# Patient Record
Sex: FEMALE | Race: AMERICAN INDIAN OR ALASKA NATIVE | NOT HISPANIC OR LATINO | URBAN - METROPOLITAN AREA
[De-identification: names, ages, dates, MRNs, and addresses within clinical notes are randomized per-mention and may not be internally consistent; named-entity substitution may affect disease eponyms.]

---

## 2022-12-21 ENCOUNTER — APPOINTMENT (OUTPATIENT)
Age: 22
Setting detail: DERMATOLOGY
End: 2022-12-21

## 2022-12-21 VITALS — HEIGHT: 68 IN | TEMPERATURE: 98.7 F | WEIGHT: 170 LBS

## 2022-12-21 DIAGNOSIS — L73.8 OTHER SPECIFIED FOLLICULAR DISORDERS: ICD-10-CM

## 2022-12-21 DIAGNOSIS — L30.4 ERYTHEMA INTERTRIGO: ICD-10-CM

## 2022-12-21 DIAGNOSIS — L72.8 OTHER FOLLICULAR CYSTS OF THE SKIN AND SUBCUTANEOUS TISSUE: ICD-10-CM

## 2022-12-21 DIAGNOSIS — L259 CONTACT DERMATITIS AND OTHER ECZEMA, UNSPECIFIED CAUSE: ICD-10-CM

## 2022-12-21 PROBLEM — L30.8 OTHER SPECIFIED DERMATITIS: Status: ACTIVE | Noted: 2022-12-21

## 2022-12-21 PROCEDURE — OTHER COUNSELING: OTHER

## 2022-12-21 PROCEDURE — OTHER PRESCRIPTION MEDICATION MANAGEMENT: OTHER

## 2022-12-21 PROCEDURE — OTHER MIPS QUALITY: OTHER

## 2022-12-21 PROCEDURE — OTHER PRESCRIPTION: OTHER

## 2022-12-21 PROCEDURE — 99203 OFFICE O/P NEW LOW 30 MIN: CPT

## 2022-12-21 RX ORDER — TRIAMCINOLONE ACETONIDE 1 MG/G
CREAM TOPICAL
Qty: 454 | Refills: 1 | Status: ERX | COMMUNITY
Start: 2022-12-21

## 2022-12-21 RX ORDER — HYDROCORTISONE 25 MG/G
CREAM TOPICAL BID
Qty: 30 | Refills: 1 | Status: ERX | COMMUNITY
Start: 2022-12-21

## 2022-12-21 ASSESSMENT — LOCATION ZONE DERM
LOCATION ZONE: HAND
LOCATION ZONE: ARM
LOCATION ZONE: TRUNK
LOCATION ZONE: EAR
LOCATION ZONE: LEG

## 2022-12-21 ASSESSMENT — LOCATION DETAILED DESCRIPTION DERM
LOCATION DETAILED: RIGHT PROXIMAL CALF
LOCATION DETAILED: RIGHT ANTERIOR EARLOBE
LOCATION DETAILED: LEFT LATERAL ABDOMEN
LOCATION DETAILED: RIGHT INFRAMAMMARY CREASE (INNER QUADRANT)
LOCATION DETAILED: LOWER STERNUM
LOCATION DETAILED: RIGHT RADIAL DORSAL HAND
LOCATION DETAILED: LEFT RADIAL DORSAL HAND
LOCATION DETAILED: LEFT PROXIMAL CALF
LOCATION DETAILED: LEFT VENTRAL PROXIMAL FOREARM
LOCATION DETAILED: LEFT INFRAMAMMARY CREASE (INNER QUADRANT)

## 2022-12-21 ASSESSMENT — LOCATION SIMPLE DESCRIPTION DERM
LOCATION SIMPLE: LEFT BREAST
LOCATION SIMPLE: RIGHT CALF
LOCATION SIMPLE: RIGHT BREAST
LOCATION SIMPLE: LEFT FOREARM
LOCATION SIMPLE: RIGHT EAR
LOCATION SIMPLE: RIGHT HAND
LOCATION SIMPLE: LEFT CALF
LOCATION SIMPLE: LEFT HAND
LOCATION SIMPLE: ABDOMEN
LOCATION SIMPLE: CHEST

## 2022-12-21 NOTE — PROCEDURE: COUNSELING
Detail Level: Simple
Detail Level: Zone
Moisturizer Recommendations: Eucerin Advanced Repair lotion
Cleanser Recommendations: Dove unscented soap
Antihistamine Recommendations: Zyrtec daily

## 2022-12-21 NOTE — PROCEDURE: PRESCRIPTION MEDICATION MANAGEMENT
Render In Strict Bullet Format?: No
Initiate Treatment: hydrocortisone 2.5 % topical cream to affected areas BID x two weeks
Detail Level: Zone
Discontinue Regimen: Long, hot showers; Tide pods; scent booster beads
Modify Regimen: Take lukewarm showers 5 min or shorter in duration, moisturize entire body after bathing (Eucerin Advanced Repair Lotion), use gentle soap on hands, use all free & clear laundry detergent
Plan: RTC in two weeks. If unimproved will biopsy.
Initiate Treatment: Triamcinolone acetonide 0.1 % topical cream - apply a thin layer to affected areas on body BID x two weeks